# Patient Record
Sex: FEMALE | Race: BLACK OR AFRICAN AMERICAN | URBAN - METROPOLITAN AREA
[De-identification: names, ages, dates, MRNs, and addresses within clinical notes are randomized per-mention and may not be internally consistent; named-entity substitution may affect disease eponyms.]

---

## 2024-03-07 ENCOUNTER — LAB (OUTPATIENT)
Dept: URBAN - METROPOLITAN AREA CLINIC 82 | Facility: CLINIC | Age: 64
End: 2024-03-07

## 2024-03-07 ENCOUNTER — OV EP (OUTPATIENT)
Dept: URBAN - METROPOLITAN AREA CLINIC 82 | Facility: CLINIC | Age: 64
End: 2024-03-07
Payer: COMMERCIAL

## 2024-03-07 VITALS
DIASTOLIC BLOOD PRESSURE: 73 MMHG | WEIGHT: 260 LBS | SYSTOLIC BLOOD PRESSURE: 121 MMHG | HEART RATE: 90 BPM | HEIGHT: 67 IN | BODY MASS INDEX: 40.81 KG/M2 | TEMPERATURE: 98.1 F

## 2024-03-07 DIAGNOSIS — Z86.010 PERSONAL HISTORY OF COLONIC POLYPS: ICD-10-CM

## 2024-03-07 DIAGNOSIS — R15.9 FREQUENT FECAL INCONTINENCE: ICD-10-CM

## 2024-03-07 DIAGNOSIS — K58.2 IRRITABLE BOWEL SYNDROME WITH BOTH CONSTIPATION AND DIARRHEA: ICD-10-CM

## 2024-03-07 DIAGNOSIS — K21.00 GASTROESOPHAGEAL REFLUX DISEASE WITH ESOPHAGITIS WITHOUT HEMORRHAGE: ICD-10-CM

## 2024-03-07 PROBLEM — 10743008: Status: ACTIVE | Noted: 2024-03-07

## 2024-03-07 PROBLEM — 266433003: Status: ACTIVE | Noted: 2024-03-07

## 2024-03-07 PROBLEM — 428283002: Status: ACTIVE | Noted: 2024-03-07

## 2024-03-07 PROBLEM — 460671000124103: Status: ACTIVE | Noted: 2024-03-07

## 2024-03-07 PROCEDURE — 99214 OFFICE O/P EST MOD 30 MIN: CPT | Performed by: INTERNAL MEDICINE

## 2024-03-07 RX ORDER — INSULIN DEGLUDEC INJECTION 100 U/ML
AS DIRECTED INJECTION, SOLUTION SUBCUTANEOUS
Status: ACTIVE | COMMUNITY

## 2024-03-07 RX ORDER — OMEPRAZOLE 40 MG/1
1 CAPSULE 30 MINUTES BEFORE MORNING MEAL CAPSULE, DELAYED RELEASE ORAL ONCE A DAY
Qty: 90 CAPSULE | Refills: 4 | OUTPATIENT
Start: 2024-03-07

## 2024-03-07 RX ORDER — SODIUM, POTASSIUM,MAG SULFATES 17.5-3.13G
177 ML X 2 SOLUTION, RECONSTITUTED, ORAL ORAL
Qty: 1 KIT | Refills: 0 | OUTPATIENT
Start: 2024-03-07 | End: 2024-03-08

## 2024-03-07 RX ORDER — DULOXETINE HYDROCHLORIDE 30 MG/1
1 CAPSULE CAPSULE, DELAYED RELEASE ORAL ONCE A DAY
Status: ACTIVE | COMMUNITY

## 2024-03-07 RX ORDER — NITROGLYCERIN 0.4 MG/1
AS DIRECTED TABLET SUBLINGUAL
Status: ACTIVE | COMMUNITY

## 2024-03-07 RX ORDER — LISINOPRIL 20 MG/1
1 TABLET TABLET ORAL ONCE A DAY
Status: ACTIVE | COMMUNITY

## 2024-03-07 RX ORDER — CLONIDINE HYDROCHLORIDE 0.2 MG/1
1 TABLET TABLET ORAL ONCE A DAY
Status: ACTIVE | COMMUNITY

## 2024-03-07 RX ORDER — RANOLAZINE 500 MG/1
1 TABLET TABLET, FILM COATED, EXTENDED RELEASE ORAL TWICE A DAY
Status: DISCONTINUED | COMMUNITY

## 2024-03-07 RX ORDER — AMLODIPINE BESYLATE 10 MG/1
1 TABLET TABLET ORAL ONCE A DAY
Status: ACTIVE | COMMUNITY

## 2024-03-07 RX ORDER — OMEPRAZOLE 40 MG/1
1 CAPSULE 30 MINUTES BEFORE MORNING MEAL CAPSULE, DELAYED RELEASE PELLETS ORAL ONCE A DAY
Qty: 90 CAPSULE | Refills: 1 | Status: ON HOLD | COMMUNITY
Start: 2021-06-24

## 2024-03-07 RX ORDER — HYDRALAZINE HYDROCHLORIDE 100 MG/1
1 TABLET TABLET, FILM COATED ORAL THREE TIMES A DAY
Status: ACTIVE | COMMUNITY

## 2024-03-07 RX ORDER — PREGABALIN 75 MG/1
1 CAPSULE CAPSULE ORAL ONCE A DAY
Status: ACTIVE | COMMUNITY

## 2024-03-07 RX ORDER — CLOPIDOGREL 75 MG/1
1 TABLET TABLET, FILM COATED ORAL ONCE A DAY
Status: DISCONTINUED | COMMUNITY

## 2024-03-07 RX ORDER — METOCLOPRAMIDE HYDROCHLORIDE 10 MG/1
1 TABLET TABLET ORAL
Qty: 14 TABLET | Refills: 0 | OUTPATIENT
Start: 2024-03-07

## 2024-03-07 RX ORDER — METFORMIN HYDROCHLORIDE 1000 MG/1
1 TABLET WITH A MEAL TABLET, FILM COATED ORAL ONCE A DAY
Status: ACTIVE | COMMUNITY

## 2024-03-07 RX ORDER — SEMAGLUTIDE 1.34 MG/ML
AS DIRECTED INJECTION, SOLUTION SUBCUTANEOUS
Status: ACTIVE | COMMUNITY

## 2024-03-07 RX ORDER — FUROSEMIDE 20 MG/1
1 TABLET TABLET ORAL ONCE A DAY
Status: DISCONTINUED | COMMUNITY

## 2024-03-07 RX ORDER — MONTELUKAST SODIUM 10 MG/1
1 TABLET TABLET, FILM COATED ORAL ONCE A DAY
Status: ACTIVE | COMMUNITY

## 2024-03-07 RX ORDER — OMEPRAZOLE 20 MG/1
1 CAPSULE 30 MINUTES BEFORE MORNING MEAL CAPSULE, DELAYED RELEASE ORAL ONCE A DAY
Status: ACTIVE | COMMUNITY

## 2024-03-07 RX ORDER — INSULIN LISPRO 100 [IU]/ML
AS DIRECTED INJECTION, SOLUTION INTRAVENOUS; SUBCUTANEOUS
Status: ACTIVE | COMMUNITY

## 2024-03-07 RX ORDER — ATORVASTATIN CALCIUM 40 MG/1
1 TABLET TABLET, FILM COATED ORAL ONCE A DAY
Status: ACTIVE | COMMUNITY

## 2024-03-07 RX ORDER — ISOSORBIDE MONONITRATE 60 MG/1
1 TABLET IN THE MORNING TABLET, EXTENDED RELEASE ORAL ONCE A DAY
Status: ACTIVE | COMMUNITY

## 2024-03-07 RX ORDER — CARVEDILOL 25 MG/1
1 TABLET WITH FOOD TABLET, FILM COATED ORAL TWICE A DAY
Status: ACTIVE | COMMUNITY

## 2024-03-07 NOTE — HPI-TODAY'S VISIT:
63-year-old female patient was seen today for chronic GI symptoms as well as fecal incontinence and abdominal bloating.  She was last seen in 2021 colonoscopy was a poor prep was recommended to repeat in 1 year.  Patient stated she has ongoing diabetes and neuropathy current medications are all reviewed.  She reports having constipation bowel movements infrequently and followed by loose stools and incontinence of the stools.  She reports having coronary artery disease and she is scheduled to have stress test because of the occasional chest pain.  Patient reports upper GI symptoms have overall stable when she takes PPI.  Patient denies any active pulmonary symptoms.  She also reports having lower abdominal pain and cramping and increased gassiness and flatulence which improves after having a bowel movement.  Patient denies any rectal bleeding.  Prior workup was reviewed.